# Patient Record
Sex: MALE | Race: WHITE | URBAN - NONMETROPOLITAN AREA
[De-identification: names, ages, dates, MRNs, and addresses within clinical notes are randomized per-mention and may not be internally consistent; named-entity substitution may affect disease eponyms.]

---

## 2018-08-22 PROBLEM — Z96.1: Noted: 2020-06-09

## 2018-08-22 PROBLEM — H52.13: Noted: 2018-08-22

## 2018-08-22 PROBLEM — H52.4: Noted: 2018-08-22

## 2018-08-22 PROBLEM — Z96.1: Noted: 2018-08-22

## 2018-08-22 PROBLEM — H40.1132: Noted: 2020-06-09

## 2018-08-22 PROBLEM — H35.63: Noted: 2018-08-22

## 2018-08-22 PROBLEM — H35.63: Noted: 2020-06-09

## 2018-08-22 PROBLEM — Z98.890: Noted: 2020-06-17

## 2018-08-22 PROBLEM — H26.493: Noted: 2018-08-22

## 2018-08-22 PROBLEM — H26.491: Noted: 2020-06-09

## 2018-08-22 PROBLEM — H40.1132: Noted: 2018-08-22

## 2020-06-03 ENCOUNTER — IMPORTED ENCOUNTER (OUTPATIENT)
Dept: URBAN - NONMETROPOLITAN AREA CLINIC 1 | Facility: CLINIC | Age: 85
End: 2020-06-03

## 2020-06-03 PROCEDURE — 66821 AFTER CATARACT LASER SURGERY: CPT

## 2020-06-03 PROCEDURE — 92014 COMPRE OPH EXAM EST PT 1/>: CPT

## 2020-06-03 NOTE — PATIENT DISCUSSION
"PCO-Explained PCO and RBAs of YAG Capsulotomy to pt. -Pt elects to proceed. YAG Caps OS today and YAG Caps OD in 1-2 weeks. 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 Shahid Trevino for further evaluation patient previously seen POAG moderate stage:  -  Discussed findings in detail w/ pt today-  Patient unsure of any family hx of glaucoma-  Patient states he was been on Timolol OU QAM for 3-4 years prescribed by Dr. Srinivasan Scanlon back in 2011. He says he has continued getting refills from Dr. Jeancarlos Reilly (retired doctor from [facility]) and just established care with new PCP Lizbeth Foley who refused to continue the Rx until seen by an eye doctor. -  He came in last visit with CVF showing inferior and superior nasal defect OU and inferior temporal defect OS. -  VF done last visit:  Reliable OU. 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 -  Optos done previously cupping appears to be stable. [facility] done last visit:  470 OD and 488 OS. -  I discontinued the Timolol back in 3/2018 not controlling IOP well at all. -  Continue Latanoprost OU QHS continue Simbrinza TID OU -  OCT done previously:  Shows superior and inferior RNFL thinning/damage OU. Worse than previous findings back in 2011.-  IOP 15 OD and 16 OS. -  C/Ds noted at 0.8 OU. -  Reviewed previous paper chart in detail. Overall appeared he has a fairly long hx of noncompliance with drops and appts. -  Stressed importance of close monitoring and risk of going completely blind if left untreated. Pt and wife both expressed understanding. -  Recommend monitoring every 4-6 months. SHANTANU OU-  Discussed findings in detail w/ pt today-  Signs/symptoms associated discussed-  Advised not to use Visine pros/cons discussed-  Start Refresh Advanced OU BID-QID PRN samples given in office today. -  Continue to monitor PRN Dermatochalasis OU-  Discussed findings w/ pt today-  No superior field of vision complaint noted at this time but if he does it could be glaucoma related. -  Will need to monitor closely for changes. Myopia/[place]-  Discussed refractive status w/ pt last visit-  Monitor yearly or PRN ""; Dr's Notes: MR  3/20/18DFE  3/20/18Optos  8/22/18OCT disc  3/20/18VF  9/22/09 - [place] 4/9/18 Gonio  Vambola 6 4/9/18 by Narinder Riggins ([phone])Previous PACH 8/1/04 - [place] ([phone])Level 4 - 3/20/18Level 3 - 4/9/18Pulled paper chart on 3/21/18 see notes below: -------------------------Previously followed by Dr. Srinivasan Scanlon from 2003 to 2011. IOP back in 2003 was 26 OU. Started Timolol OS QAM 8/12/04 with IOP high at 37 OD and 31 OS. PACH done 8/12/04 @ 488 OD and 495 OS. Had CE OS 8/24/04 by Srinivasan Scanlon. D/c Timolol 10/19/04 due to ""weight loss x38lbs in 4 weeks. IOP at that time was 20 OD and 15 OS. Started Xalatan OU QHS. 4/20/05 visit pt was no longer using Xalatan. IOP was 25 OD and 22 OS. Asked to restart Xalata OU QHS. 12/12/06 - pt lost to follow up stopped the Xalatan due to ""weight loss"". IOP 23 OD and 21 OS. No glaucoma drops prescribed on this date. 7/14/08 - pt lost to follow up again not using any glaucoma drops. IOP was 25 OD and 23 OS. Started Travatan Z OU QHS. PCO also noted this visit but deferred Yag treatment. 8/25/08 - IOP noted at 19 OD and 18 OS with Travatan use alone. Wanted T-IOP to be <14 added Alphagan P generic (Brimonidine) OU BID.9/25/08 - IOP noted at 12 OU continue Brimonidine and Travatan use.3/24/08 - IOP noted at 12 OD and 14 OS pt admits he forgets glaucoma drops at times. Continue Travatan & Brimonidine stressed importance of use.9/22/09 - IOP 15 OD and 13 OS. Pt stopped Brimonidine b/c it turned eyes ""red"" d/c this med and start Timolol OU QAM and continue Travatan OU QHS. 10/22/09 - IOP noted at 13 OU2/11/10 - IOP up at 21 OD and 20 OS hasn't used drops x 3 days4/22/10 - IOP 13 OD and 14 OS d/c Timolol (Istaol due to cost) and start Timoptic GFS OU QAM continue Travatan OU QHS. Referred for Cat Eval OD.4/29/10 - CE OD by [place] Travatan OD continue OScontinue Timolol 0.5% OU QAM6/24/10 - IOP 10 OD adn 14 OS continue Travatan and Timolol3/25/11 - IOP 11 OD and 21 OS continued Travatan and Istalol and told to follow up in 6 months.  **Pt never came back to the clinic until 3/20/18 to see Kumar Pantoja OD***"

## 2020-06-09 ENCOUNTER — IMPORTED ENCOUNTER (OUTPATIENT)
Dept: URBAN - NONMETROPOLITAN AREA CLINIC 1 | Facility: CLINIC | Age: 85
End: 2020-06-09

## 2020-06-09 PROCEDURE — 66821 AFTER CATARACT LASER SURGERY: CPT

## 2020-06-09 NOTE — PATIENT DISCUSSION
"PCO-Explained PCO and RBAs of YAG Capsulotomy to pt. -Pt elects to proceed. YAG Caps OD today. -Written consent signed and obtained prior to procedure-s/p YAG PC OS with open capsule noted today. 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 Bridgett Nagel for further evaluation patient previously seen POAG moderate stage:  -  Discussed findings in detail w/ pt today-  Patient unsure of any family hx of glaucoma-  Patient states he was been on Timolol OU QAM for 3-4 years prescribed by Dr. Yuly Salgado back in 2011. He says he has continued getting refills from Dr. Manisha Islas (retired doctor from [facility]) and just established care with new PCP Satish Randhawa who refused to continue the Rx until seen by an eye doctor. -  He came in last visit with CVF showing inferior and superior nasal defect OU and inferior temporal defect OS. -  VF done last visit:  Reliable OU. 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 -  Optos done previously cupping appears to be stable. [facility] done last visit:  470 OD and 488 OS. -  I discontinued the Timolol back in 3/2018 not controlling IOP well at all. -  Continue Latanoprost OU QHS continue Simbrinza TID OU -  OCT done previously:  Shows superior and inferior RNFL thinning/damage OU. Worse than previous findings back in 2011.-  IOP 15 OD and 16 OS. -  C/Ds noted at 0.8 OU. -  Reviewed previous paper chart in detail. Overall appeared he has a fairly long hx of noncompliance with drops and appts. -  Stressed importance of close monitoring and risk of going completely blind if left untreated. Pt and wife both expressed understanding. -  Recommend monitoring every 4-6 months. SHANTANU OU-  Discussed findings in detail w/ pt today-  Signs/symptoms associated discussed-  Advised not to use Visine pros/cons discussed-  Start Refresh Advanced OU BID-QID PRN samples given in office today. -  Continue to monitor PRN Dermatochalasis OU-  Discussed findings w/ pt today-  No superior field of vision complaint noted at this time but if he does it could be glaucoma related. -  Will need to monitor closely for changes. Myopia/[place]-  Discussed refractive status w/ pt last visit-  Monitor yearly or PRN ""; Dr's Notes: MR  3/20/18DFE  3/20/18Optos  8/22/18OCT disc  3/20/18VF  9/22/09 - [place] 4/9/18 Gonio  Vambola 6 4/9/18 by Amena Morrison ([phone])Previous PACH 8/1/04 - [place] ([phone])Level 4 - 3/20/18Level 3 - 4/9/18Pulled paper chart on 3/21/18 see notes below: -------------------------Previously followed by Dr. Yuly Salgado from 2003 to 2011. IOP back in 2003 was 26 OU. Started Timolol OS QAM 8/12/04 with IOP high at 37 OD and 31 OS. PACH done 8/12/04 @ 488 OD and 495 OS. Had CE OS 8/24/04 by Yuly Salgado. D/c Timolol 10/19/04 due to ""weight loss x38lbs in 4 weeks. IOP at that time was 20 OD and 15 OS. Started Xalatan OU QHS. 4/20/05 visit pt was no longer using Xalatan. IOP was 25 OD and 22 OS. Asked to restart Xalata OU QHS. 12/12/06 - pt lost to follow up stopped the Xalatan due to ""weight loss"". IOP 23 OD and 21 OS. No glaucoma drops prescribed on this date. 7/14/08 - pt lost to follow up again not using any glaucoma drops. IOP was 25 OD and 23 OS. Started Travatan Z OU QHS. PCO also noted this visit but deferred Yag treatment. 8/25/08 - IOP noted at 19 OD and 18 OS with Travatan use alone. Wanted T-IOP to be <14 added Alphagan P generic (Brimonidine) OU BID.9/25/08 - IOP noted at 12 OU continue Brimonidine and Travatan use.3/24/08 - IOP noted at 12 OD and 14 OS pt admits he forgets glaucoma drops at times. Continue Travatan & Brimonidine stressed importance of use.9/22/09 - IOP 15 OD and 13 OS. Pt stopped Brimonidine b/c it turned eyes ""red"" d/c this med and start Timolol OU QAM and continue Travatan OU QHS. 10/22/09 - IOP noted at 13 OU2/11/10 - IOP up at 21 OD and 20 OS hasn't used drops x 3 days4/22/10 - IOP 13 OD and 14 OS d/c Timolol (Istaol due to cost) and start Timoptic GFS OU QAM continue Travatan OU QHS. Referred for Cat Eval OD.4/29/10 - CE OD by Yashd/c Travatan OD continue OScontinue Timolol 0.5% OU QAM6/24/10 - IOP 10 OD adn 14 OS continue Travatan and Timolol3/25/11 - IOP 11 OD and 21 OS continued Travatan and Istalol and told to follow up in 6 months.  **Pt never came back to the clinic until 3/20/18 to see Elaine Corrales OD***"

## 2020-06-17 ENCOUNTER — IMPORTED ENCOUNTER (OUTPATIENT)
Dept: URBAN - NONMETROPOLITAN AREA CLINIC 1 | Facility: CLINIC | Age: 85
End: 2020-06-17

## 2020-06-17 PROCEDURE — 99024 POSTOP FOLLOW-UP VISIT: CPT

## 2020-06-17 NOTE — PATIENT DISCUSSION
"1 Week POV Yag PC OD 6/9/20 Yag PC OS 6/3/20- Discussed diagnosis in detail with patient - S/P YAG PC OU- Good central opening - MR done today by Dr. 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 Marcin Hart back in 2011. He says he has continued getting refills from Dr. Dharmesh Raines (retired doctor from [facility]) and just established care with new PCP Aundrea Perea who refused to continue the Rx until seen by an eye doctor. -  He came in last visit with CVF showing inferior and superior nasal defect OU and inferior temporal defect OS. -  VF done last visit:  Reliable OU. 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 -  Optos done previously cupping appears to be stable. [employer] done last visit:  470 OD and 488 OS. -  I discontinued the Timolol back in 3/2018 not controlling IOP well at all. -  Continue Latanoprost OU QHS continue Simbrinza TID OU -  OCT done previously:  Shows superior and inferior RNFL thinning/damage OU. Worse than previous findings back in 2011.-  IOP 15 OD and 16 OS. -  C/Ds noted at 0.8 OU. -  Reviewed previous paper chart in detail. Overall appeared he has a fairly long hx of noncompliance with drops and appts. -  Stressed importance of close monitoring and risk of going completely blind if left untreated. Pt and wife both expressed understanding. -  Recommend monitoring every 4-6 months. SHANTANU OU-  Discussed findings in detail w/ pt today-  Signs/symptoms associated discussed-  Advised not to use Visine pros/cons discussed-  Start Refresh Advanced OU BID-QID PRN samples given in office today. -  Continue to monitor PRN Dermatochalasis OU-  Discussed findings w/ pt today-  No superior field of vision complaint noted at this time but if he does it could be glaucoma related. -  Will need to monitor closely for changes. Myopia/[place]-  Discussed refractive status w/ pt last visit-  Monitor yearly or PRN ""; Dr's Notes: MR  3/20/18DFE  3/20/18Optos  8/22/18OCT disc  3/20/18VF  9/22/09 - [place] 4/9/18 Gonio  Vambola 6 4/9/18 by [facility] ([phone])Previous PACH 8/1/04 - [place] ([phone])Level 4 - 3/20/18Level 3 - 4/9/18Pulled paper chart on 3/21/18 see notes below: -------------------------Previously followed by Dr. Marcin Hart from 2003 to 2011. IOP back in 2003 was 26 OU. Started Timolol OS QAM 8/12/04 with IOP high at 37 OD and 31 OS. PACH done 8/12/04 @ 488 OD and 495 OS. Had CE OS 8/24/04 by Marcin Hart. D/c Timolol 10/19/04 due to ""weight loss x38lbs in 4 weeks. IOP at that time was 20 OD and 15 OS. Started Xalatan OU QHS. 4/20/05 visit pt was no longer using Xalatan. IOP was 25 OD and 22 OS. Asked to restart Xalata OU QHS. 12/12/06 - pt lost to follow up stopped the Xalatan due to ""weight loss"". IOP 23 OD and 21 OS. No glaucoma drops prescribed on this date. 7/14/08 - pt lost to follow up again not using any glaucoma drops. IOP was 25 OD and 23 OS. Started Travatan Z OU QHS. PCO also noted this visit but deferred Yag treatment. 8/25/08 - IOP noted at 19 OD and 18 OS with Travatan use alone. Wanted T-IOP to be <14 added Alphagan P generic (Brimonidine) OU BID.9/25/08 - IOP noted at 12 OU continue Brimonidine and Travatan use.3/24/08 - IOP noted at 12 OD and 14 OS pt admits he forgets glaucoma drops at times. Continue Travatan & Brimonidine stressed importance of use.9/22/09 - IOP 15 OD and 13 OS. Pt stopped Brimonidine b/c it turned eyes ""red"" d/c this med and start Timolol OU QAM and continue Travatan OU QHS. 10/22/09 - IOP noted at 13 OU2/11/10 - IOP up at 21 OD and 20 OS hasn't used drops x 3 days4/22/10 - IOP 13 OD and 14 OS d/c Timolol (Istaol due to cost) and start Timoptic GFS OU QAM continue Travatan OU QHS. Referred for Cat Eval OD.4/29/10 - CE OD by Yashd/neri Travatan OD continue OScontinue Timolol 0.5% OU QAM6/24/10 - IOP 10 OD adn 14 OS continue Travatan and Timolol3/25/11 - IOP 11 OD and 21 OS continued Travatan and Istalol and told to follow up in 6 months.  **Pt never came back to the clinic until 3/20/18 to see Mely Osorio OD***"

## 2022-04-10 ASSESSMENT — VISUAL ACUITY
OS_SC: 20/40
OS_SC: 20/40
OD_SC: 20/100-2
OD_SC: 20/100
OD_SC: 20/125+
OS_SC: 20/30-2

## 2022-04-10 ASSESSMENT — TONOMETRY
OD_IOP_MMHG: 14
OS_IOP_MMHG: 14
OD_IOP_MMHG: 15
OS_IOP_MMHG: 16
OS_IOP_MMHG: 16
OD_IOP_MMHG: 15

## 2022-04-10 ASSESSMENT — PACHYMETRY
OD_CT_UM: 470; ADJ: VTHIN
OS_CT_UM: 488; ADJ: VTHIN
OS_CT_UM: 488; ADJ: VTHIN
OD_CT_UM: 470; ADJ: VTHIN
OD_CT_UM: 470; ADJ: VTHIN
OS_CT_UM: 488; ADJ: VTHIN